# Patient Record
Sex: FEMALE | Race: WHITE | NOT HISPANIC OR LATINO | URBAN - METROPOLITAN AREA
[De-identification: names, ages, dates, MRNs, and addresses within clinical notes are randomized per-mention and may not be internally consistent; named-entity substitution may affect disease eponyms.]

---

## 2018-06-12 PROBLEM — Z00.00 ENCOUNTER FOR PREVENTIVE HEALTH EXAMINATION: Status: ACTIVE | Noted: 2018-06-12

## 2018-06-20 ENCOUNTER — OUTPATIENT (OUTPATIENT)
Dept: OUTPATIENT SERVICES | Facility: HOSPITAL | Age: 58
LOS: 1 days | End: 2018-06-20

## 2018-06-20 ENCOUNTER — TRANSCRIPTION ENCOUNTER (OUTPATIENT)
Age: 58
End: 2018-06-20

## 2018-06-20 VITALS
RESPIRATION RATE: 16 BRPM | HEIGHT: 63.75 IN | TEMPERATURE: 98 F | WEIGHT: 194.01 LBS | SYSTOLIC BLOOD PRESSURE: 122 MMHG | DIASTOLIC BLOOD PRESSURE: 82 MMHG | HEART RATE: 68 BPM

## 2018-06-20 DIAGNOSIS — N62 HYPERTROPHY OF BREAST: ICD-10-CM

## 2018-06-20 RX ORDER — SODIUM CHLORIDE 9 MG/ML
3 INJECTION INTRAMUSCULAR; INTRAVENOUS; SUBCUTANEOUS EVERY 8 HOURS
Qty: 0 | Refills: 0 | Status: DISCONTINUED | OUTPATIENT
Start: 2018-06-21 | End: 2018-07-06

## 2018-06-20 RX ORDER — SODIUM CHLORIDE 9 MG/ML
1000 INJECTION, SOLUTION INTRAVENOUS
Qty: 0 | Refills: 0 | Status: DISCONTINUED | OUTPATIENT
Start: 2018-06-21 | End: 2018-07-06

## 2018-06-20 NOTE — H&P PST ADULT - PROBLEM SELECTOR PLAN 1
Scheduled for bilateral breast reduction on 06/21/18. Pre op instructions, famotidine, chlorhexidine gluconate soap given and explained. Pt verbalized understanding.

## 2018-06-20 NOTE — H&P PST ADULT - NEGATIVE CARDIOVASCULAR SYMPTOMS
no orthopnea/no chest pain/no claudication/no dyspnea on exertion/no palpitations/no peripheral edema/no paroxysmal nocturnal dyspnea

## 2018-06-20 NOTE — H&P PST ADULT - RS GEN PE MLT RESP DETAILS PC
no rales/clear to auscultation bilaterally/good air movement/breath sounds equal/no rhonchi/airway patent/no wheezes/respirations non-labored/no subcutaneous emphysema/no chest wall tenderness/no intercostal retractions

## 2018-06-20 NOTE — H&P PST ADULT - NSANTHOSAYNRD_GEN_A_CORE
No. JOHNNA screening performed.  STOP BANG Legend: 0-2 = LOW Risk; 3-4 = INTERMEDIATE Risk; 5-8 = HIGH Risk

## 2018-06-20 NOTE — H&P PST ADULT - NEGATIVE NEUROLOGICAL SYMPTOMS
no weakness/no generalized seizures/no focal seizures/no paresthesias/no syncope/no tremors/no vertigo/no loss of sensation/no hemiparesis/no transient paralysis/no difficulty walking/no confusion

## 2018-06-20 NOTE — H&P PST ADULT - HISTORY OF PRESENT ILLNESS
57 y/o  female with PMH: Migraines presents to [facility] for pre op evaluation with long term h/o back pain. Pre op diagnosis: hypertrophy of breast. Now scheduled for bilateral breast reduction on 06/21/18

## 2018-06-20 NOTE — H&P PST ADULT - NEGATIVE BREAST SYMPTOMS
no breast tenderness R/no breast lump L/no breast lump R/no nipple discharge L/no nipple discharge R/no breast tenderness L

## 2018-06-20 NOTE — H&P PST ADULT - NEGATIVE OPHTHALMOLOGIC SYMPTOMS
no scleral injection L/no diplopia/no discharge L/no lacrimation R/no blurred vision L/no discharge R/no pain L/no irritation R/no irritation L/no blurred vision R/no loss of vision R/no scleral injection R/no photophobia/no lacrimation L/no pain R/no loss of vision L

## 2018-06-21 ENCOUNTER — OUTPATIENT (OUTPATIENT)
Dept: OUTPATIENT SERVICES | Facility: HOSPITAL | Age: 58
LOS: 1 days | Discharge: HOME | End: 2018-06-21
Payer: COMMERCIAL

## 2018-06-21 ENCOUNTER — RESULT REVIEW (OUTPATIENT)
Age: 58
End: 2018-06-21

## 2018-06-21 VITALS
OXYGEN SATURATION: 95 % | TEMPERATURE: 98 F | HEIGHT: 63.75 IN | WEIGHT: 194.01 LBS | DIASTOLIC BLOOD PRESSURE: 53 MMHG | RESPIRATION RATE: 16 BRPM | HEART RATE: 76 BPM | SYSTOLIC BLOOD PRESSURE: 123 MMHG

## 2018-06-21 VITALS
DIASTOLIC BLOOD PRESSURE: 63 MMHG | RESPIRATION RATE: 19 BRPM | HEART RATE: 95 BPM | OXYGEN SATURATION: 100 % | SYSTOLIC BLOOD PRESSURE: 122 MMHG

## 2018-06-21 DIAGNOSIS — N62 HYPERTROPHY OF BREAST: ICD-10-CM

## 2018-06-21 DIAGNOSIS — N84.0 POLYP OF CORPUS UTERI: Chronic | ICD-10-CM

## 2018-06-21 PROCEDURE — 88305 TISSUE EXAM BY PATHOLOGIST: CPT | Mod: 26

## 2018-06-21 PROCEDURE — 19318 BREAST REDUCTION: CPT | Mod: 50

## 2018-06-21 RX ORDER — ONDANSETRON 8 MG/1
4 TABLET, FILM COATED ORAL ONCE
Qty: 0 | Refills: 0 | Status: COMPLETED | OUTPATIENT
Start: 2018-06-21 | End: 2018-06-21

## 2018-06-21 RX ORDER — AZTREONAM 2 G
1 VIAL (EA) INJECTION
Qty: 0 | Refills: 0 | COMMUNITY

## 2018-06-21 RX ORDER — FENTANYL CITRATE 50 UG/ML
25 INJECTION INTRAVENOUS
Qty: 0 | Refills: 0 | Status: DISCONTINUED | OUTPATIENT
Start: 2018-06-21 | End: 2018-06-21

## 2018-06-21 RX ORDER — CEPHALEXIN 500 MG
1 CAPSULE ORAL
Qty: 0 | Refills: 0 | COMMUNITY

## 2018-06-21 RX ORDER — OXYCODONE HYDROCHLORIDE 5 MG/1
5 TABLET ORAL ONCE
Qty: 0 | Refills: 0 | Status: DISCONTINUED | OUTPATIENT
Start: 2018-06-21 | End: 2018-06-21

## 2018-06-21 RX ORDER — METOCLOPRAMIDE HCL 10 MG
10 TABLET ORAL ONCE
Qty: 0 | Refills: 0 | Status: COMPLETED | OUTPATIENT
Start: 2018-06-21 | End: 2018-06-21

## 2018-06-21 RX ADMIN — FENTANYL CITRATE 25 MICROGRAM(S): 50 INJECTION INTRAVENOUS at 21:30

## 2018-06-21 RX ADMIN — FENTANYL CITRATE 25 MICROGRAM(S): 50 INJECTION INTRAVENOUS at 21:13

## 2018-06-21 RX ADMIN — Medication 10 MILLIGRAM(S): at 20:45

## 2018-06-21 RX ADMIN — FENTANYL CITRATE 25 MICROGRAM(S): 50 INJECTION INTRAVENOUS at 20:00

## 2018-06-21 RX ADMIN — ONDANSETRON 4 MILLIGRAM(S): 8 TABLET, FILM COATED ORAL at 21:11

## 2018-06-21 RX ADMIN — SODIUM CHLORIDE 3 MILLILITER(S): 9 INJECTION INTRAMUSCULAR; INTRAVENOUS; SUBCUTANEOUS at 23:10

## 2018-06-21 RX ADMIN — ONDANSETRON 4 MILLIGRAM(S): 8 TABLET, FILM COATED ORAL at 20:00

## 2018-06-21 RX ADMIN — FENTANYL CITRATE 25 MICROGRAM(S): 50 INJECTION INTRAVENOUS at 20:15

## 2018-06-21 NOTE — ASU DISCHARGE PLAN (ADULT/PEDIATRIC). - MEDICATION SUMMARY - MEDICATIONS TO STOP TAKING
I will STOP taking the medications listed below when I get home from the hospital:    Bactrim  mg-160 mg oral tablet  -- 1 tab(s) by mouth 2 times a day for 3 days

## 2018-06-21 NOTE — ASU DISCHARGE PLAN (ADULT/PEDIATRIC). - MEDICATION SUMMARY - MEDICATIONS TO TAKE
I will START or STAY ON the medications listed below when I get home from the hospital:    Norco 5 mg-325 mg oral tablet  -- 1 tab(s) by mouth every 6 hours, As Needed  -- Indication: For pain    Keflex 500 mg oral capsule  -- 1 cap(s) by mouth every 6 hours  -- Indication: For prevent infection

## 2018-06-28 LAB — SURGICAL PATHOLOGY STUDY: SIGNIFICANT CHANGE UP

## 2020-04-30 ENCOUNTER — TRANSCRIPTION ENCOUNTER (OUTPATIENT)
Age: 60
End: 2020-04-30

## 2022-04-01 NOTE — H&P PST ADULT - NEGATIVE SKIN SYMPTOMS
denied refill pt needs appt was last in office 10/2020.   no itching/no dryness/no rash/no tumor/no pitted nails/no brittle nails/no hair loss

## 2023-01-09 NOTE — H&P PST ADULT - LIVES WITH, PROFILE
[FreeTextEntry1] : Bariatric surgery history: none\par Obesity co-morbidities: HLD, vit d def, GERD\par Comorbidities improved or resolved: none\par Anti-obesity medications: Ozempic\par Obesity medication side effects: none\par \par Ms. LUIS SIEGEL is a 51 year year old female who presents for evaluation and treatment of Class 3 obesity, now Class 2 obesity. \par +Does have an IUD unsure of menopause status\par \par Obesity related co- morbidities: Vit D deficiency, mild GERD - only OTCs\par \par Patient lives -  and kids - 2 boys. 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 \par \par Weight History:\par Lowest adult weight: 130\par Highest adult weight: 215\par \par Interim:\par - been on Ozempic 1mg.  decr appetite, eating healthier inclu going out to dinner.  about the same.  no snacking. \par - f/u with [facility] in 6 months.\par - has been biking on some weekends with good weather\par - has been eating bkfast, lunch and dinner.  motivated to get out the bikes and go out for more walks w \par - having snacks on hand - > fruits, nuts\par - bkfast around 10am, also making it a point to have lunch @ work.  \par - dinner - eating before 8pm\par -  started walking with her more often, that’s been positive\par \par Has gained 0-5 pounds over the past year.\par \par Obesity began in 20s  Weight gain has occurred with: more sedentary.  First baby weight came off pretty quickly.  nd baby in 2005.  Went up to 200, down to 180s.  In her early 40s, changed diet and got rid of sugar and lost about 35 lbs to 170.  Maintained about a year then gradually increased. \par \par Past weight loss attempts include: WW.  These have produced a maximum of 35 pounds of weight loss.  \par Anti-obesity medications in the past: No. Way back in 20s, herbal pills.\par \par Reasons for desiring weight loss:  not get to winded up the stairs.  wants to be more active. wanting to get in a shape to hike more. likes the outdoors.  Summer.  \par Perceived obstacles to losing weight: sugary stuff\par \par Sleep: 7-8 hours. has gotten better in the last few months.   notices that she tends to breath heavier. \par +has been meditating.  \par \par Has 1 regular meals a day.  Dinner only.  Now - > 3 meals a day. \par \par Diet history:\par wakes up at: 6 am \par B: cup of coffee while she goes to bed, breakfast - yogurt or overnight oats w fruit. \par L: "doesn't have time"-> these days has lunch every day.  leftovers.  \par snacks -  2 pm - yogurt smoothies, nuts, cheese sticks\par at home - really hungry when she didn't bring snacks - chips/crackers/ turkey with cheese -> no snacks these days\par D: both cook 730-8 pm - rice with beans and a protein. mashed potatoes or vegetables, and protein. stays away from red meat. lots of chicken, fish and shrimp as well.  pork sometimes. Lean protein, smaller amount of carbs, with veg.  +cauliflower rice. \par \par Sleep\par bedtime - 10 30pm\par Sleeping better now.  \par \par On weekends has breakfast - egg scramble w peppers onions tomatoes.  sometimes lunch, usually eats out on weekends - will share appetizer, regular meal.  +italian restaurants is one of her favorites. \par \par snacks: yes\par eating after dinner: no\par overeating episodes: sometimes.  maybe 2-3 times a week. \par \par Sodas/fast food/processed foods: take out once a week. varies. +fried, chips\par \par Water intake per day: 4-5 x8 oz cups per day\par 1 cup coffee per day - non dairy creamer.\par \par Physical activity:\par Patient enjoys: walking, biking\par Current physical activity: walking twice a week 30 minutes. \par Have weights, yoga mat. \par \par Habits patient would like to change: eating more regularly\par Level of interest in losing weight: 5/5\par Community support: 4/5\par \par Factors that have helped in the past with losing weight and keeping it off: eating more vegetables and eating consistently\par \par  spouse